# Patient Record
Sex: MALE | Race: WHITE | NOT HISPANIC OR LATINO | Employment: UNEMPLOYED | URBAN - NONMETROPOLITAN AREA
[De-identification: names, ages, dates, MRNs, and addresses within clinical notes are randomized per-mention and may not be internally consistent; named-entity substitution may affect disease eponyms.]

---

## 2020-12-07 ENCOUNTER — OFFICE VISIT (OUTPATIENT)
Dept: URGENT CARE | Facility: PHYSICIAN GROUP | Age: 25
End: 2020-12-07

## 2020-12-07 VITALS
HEART RATE: 99 BPM | OXYGEN SATURATION: 99 % | BODY MASS INDEX: 27.28 KG/M2 | HEIGHT: 68 IN | WEIGHT: 180 LBS | SYSTOLIC BLOOD PRESSURE: 150 MMHG | TEMPERATURE: 98.2 F | DIASTOLIC BLOOD PRESSURE: 78 MMHG

## 2020-12-07 DIAGNOSIS — R10.31 RIGHT LOWER QUADRANT PAIN: ICD-10-CM

## 2020-12-07 DIAGNOSIS — R10.13 EPIGASTRIC PAIN: ICD-10-CM

## 2020-12-07 DIAGNOSIS — R12 HEART BURN: ICD-10-CM

## 2020-12-07 DIAGNOSIS — R19.5 LOOSE STOOLS: ICD-10-CM

## 2020-12-07 LAB
APPEARANCE UR: CLEAR
BILIRUB UR STRIP-MCNC: NORMAL MG/DL
COLOR UR AUTO: YELLOW
GLUCOSE UR STRIP.AUTO-MCNC: NORMAL MG/DL
KETONES UR STRIP.AUTO-MCNC: NORMAL MG/DL
LEUKOCYTE ESTERASE UR QL STRIP.AUTO: NORMAL
NITRITE UR QL STRIP.AUTO: NORMAL
PH UR STRIP.AUTO: 6 [PH] (ref 5–8)
PROT UR QL STRIP: NORMAL MG/DL
RBC UR QL AUTO: NORMAL
SP GR UR STRIP.AUTO: 1.02
UROBILINOGEN UR STRIP-MCNC: NORMAL MG/DL

## 2020-12-07 PROCEDURE — 99204 OFFICE O/P NEW MOD 45 MIN: CPT | Performed by: PHYSICIAN ASSISTANT

## 2020-12-07 PROCEDURE — 81002 URINALYSIS NONAUTO W/O SCOPE: CPT | Performed by: PHYSICIAN ASSISTANT

## 2020-12-07 RX ORDER — OMEPRAZOLE 20 MG/1
20 CAPSULE, DELAYED RELEASE ORAL DAILY
Qty: 30 CAP | Refills: 1 | Status: SHIPPED | OUTPATIENT
Start: 2020-12-07

## 2020-12-07 NOTE — PROGRESS NOTES
Chief Complaint   Patient presents with   • Abdominal Pain     x2 weeks. Generalized upper abdominal pain, diarrhea.       HISTORY OF PRESENT ILLNESS: Patient is a 25 y.o. male who presents today for the following:    Patient comes in for evaluation of abdominal pain and loose stool.  He has had heartburn for the better part of the last 2 to 3 years.  He previously you Zantac and states it helped but has not been using it recently.  He has not tried any over-the-counter medication in the last few months.  He does report loose stool intermittently over the last 2 weeks, sometimes 2 times an hour, sometimes 2 times a day.  He complains of epigastric pain.  He denies fever, nausea or vomiting.  He had surgery as an infant to descend a testicle but otherwise has not had any abdominal surgeries.  He states that his stool is occasionally lighter than normal in color and admits he does not drink as much as he should be drinking.  He notices worsening epigastric pain when he eats fast food or other heavy, greasy, fried foods.    There are no active problems to display for this patient.      Allergies:Patient has no known allergies.    Current Outpatient Medications Ordered in Epic   Medication Sig Dispense Refill   • omeprazole (PRILOSEC) 20 MG delayed-release capsule Take 1 Cap by mouth every day. 30 Cap 1     No current Epic-ordered facility-administered medications on file.        History reviewed. No pertinent past medical history.    Social History     Tobacco Use   • Smoking status: Current Every Day Smoker     Packs/day: 0.75     Types: Cigarettes   • Smokeless tobacco: Never Used   Substance Use Topics   • Alcohol use: Not on file   • Drug use: Not on file       No family status information on file.   History reviewed. No pertinent family history.    Review of Systems:    Constitutional ROS: No unexpected change in weight, No weakness, No fatigue  Eye ROS: No recent significant change in vision, No eye pain,  "redness, discharge  Ear ROS: No drainage, No tinnitus or vertigo, No recent change in hearing  Mouth/Throat ROS: No teeth or gum problems, No bleeding gums, No tongue complaints  Neck ROS: No swollen glands, No significant pain in neck  Pulmonary ROS: No chronic cough, sputum, or hemoptysis, No dyspnea on exertion, No wheezing  Cardiovascular ROS: No diaphoresis, No edema, No palpitations  GI: Positive for nausea, vomiting, diarrhea.  Musculoskeletal/Extremities ROS: No peripheral edema, No pain, redness or swelling on the joints  Hematologic/Lymphatic ROS: No chills, No night sweats, No weight loss  Skin/Integumentary ROS: No edema, No evidence of rash, No itching    Exam:  /78   Pulse 99   Temp 36.8 °C (98.2 °F)   Ht 1.727 m (5' 8\")   Wt 81.6 kg (180 lb)   SpO2 99%   General: Well developed, well nourished. No distress.    Pulmonary: Unlabored respiratory effort. Lungs clear to auscultation, no wheezes, no rhonchi.    Cardiovascular: Regular rate and rhythm without murmur.   Neurologic: Grossly nonfocal. No facial asymmetry noted.  Abdomen: Soft, nondistended.  Bowel sounds within normal limits.  Epigastric and right lower quadrant tenderness on exam without guarding or rebound.  Lymph: No cervical lymphadenopathy noted.  Skin: Warm, dry, good turgor. No rashes in visible areas.   Psych: Normal mood. Alert and oriented to person, place and time.    UA: Negative    Assessment/Plan:  No acute abdomen noted on exam.  Will have patient start with omeprazole 20 mg daily for at least 3 to 4 weeks.  May need to switch to Nexium 20 mg daily as needed.  May need further lab work, imaging.  Discussed red flags and ER precautions.  Discussed red flags and ER precautions.  1. Epigastric pain  POCT Urinalysis    omeprazole (PRILOSEC) 20 MG delayed-release capsule   2. Loose stools  POCT Urinalysis   3. Right lower quadrant pain      On exam only.   4. Heart burn  omeprazole (PRILOSEC) 20 MG delayed-release capsule "

## 2020-12-07 NOTE — LETTER
December 7, 2020         Patient: Charlie Elizabeth   YOB: 1995   Date of Visit: 12/7/2020           To Whom it May Concern:    Charlie Elizabeth was seen in my clinic on 12/7/2020. He may return to work today. Please excuse him for missing work 12/1, 12/2, and 12/4.    If you have any questions or concerns, please don't hesitate to call.        Sincerely,           Lucero Ambrosio P.A.-C.  Electronically Signed

## 2020-12-07 NOTE — PATIENT INSTRUCTIONS
I recommend trying omeprazole 20 mg daily for the next 3 to 4 weeks.  If this does not help, switch to Nexium 20 mg daily for at least 2 to 4 weeks.  This may need further intervention including lab work and possible imaging.